# Patient Record
Sex: MALE | Race: WHITE | NOT HISPANIC OR LATINO | Employment: UNEMPLOYED | URBAN - NONMETROPOLITAN AREA
[De-identification: names, ages, dates, MRNs, and addresses within clinical notes are randomized per-mention and may not be internally consistent; named-entity substitution may affect disease eponyms.]

---

## 2022-09-30 ENCOUNTER — OFFICE VISIT (OUTPATIENT)
Dept: CARDIOLOGY | Facility: CLINIC | Age: 57
End: 2022-09-30

## 2022-09-30 ENCOUNTER — LAB (OUTPATIENT)
Dept: LAB | Facility: HOSPITAL | Age: 57
End: 2022-09-30

## 2022-09-30 VITALS
HEIGHT: 74 IN | WEIGHT: 268 LBS | SYSTOLIC BLOOD PRESSURE: 136 MMHG | OXYGEN SATURATION: 98 % | HEART RATE: 92 BPM | BODY MASS INDEX: 34.39 KG/M2 | DIASTOLIC BLOOD PRESSURE: 84 MMHG

## 2022-09-30 DIAGNOSIS — I10 HYPERTENSION, UNSPECIFIED TYPE: ICD-10-CM

## 2022-09-30 DIAGNOSIS — I25.10 CORONARY ARTERY DISEASE INVOLVING NATIVE CORONARY ARTERY OF NATIVE HEART WITHOUT ANGINA PECTORIS: ICD-10-CM

## 2022-09-30 DIAGNOSIS — E66.2 CLASS 1 OBESITY WITH ALVEOLAR HYPOVENTILATION AND BODY MASS INDEX (BMI) OF 34.0 TO 34.9 IN ADULT, UNSPECIFIED WHETHER SERIOUS COMORBIDITY PRESENT: ICD-10-CM

## 2022-09-30 DIAGNOSIS — Z76.89 ENCOUNTER TO ESTABLISH CARE: Primary | ICD-10-CM

## 2022-09-30 LAB
ANION GAP SERPL CALCULATED.3IONS-SCNC: 8.5 MMOL/L (ref 5–15)
BASOPHILS # BLD AUTO: 0.03 10*3/MM3 (ref 0–0.2)
BASOPHILS NFR BLD AUTO: 0.6 % (ref 0–1.5)
BUN SERPL-MCNC: 9 MG/DL (ref 6–20)
BUN/CREAT SERPL: 11.8 (ref 7–25)
CALCIUM SPEC-SCNC: 9.5 MG/DL (ref 8.6–10.5)
CHLORIDE SERPL-SCNC: 104 MMOL/L (ref 98–107)
CHOLEST SERPL-MCNC: 126 MG/DL (ref 0–200)
CO2 SERPL-SCNC: 27.5 MMOL/L (ref 22–29)
CREAT SERPL-MCNC: 0.76 MG/DL (ref 0.76–1.27)
DEPRECATED RDW RBC AUTO: 41.7 FL (ref 37–54)
EGFRCR SERPLBLD CKD-EPI 2021: 104.8 ML/MIN/1.73
EOSINOPHIL # BLD AUTO: 0.16 10*3/MM3 (ref 0–0.4)
EOSINOPHIL NFR BLD AUTO: 3.3 % (ref 0.3–6.2)
ERYTHROCYTE [DISTWIDTH] IN BLOOD BY AUTOMATED COUNT: 13 % (ref 12.3–15.4)
GLUCOSE SERPL-MCNC: 169 MG/DL (ref 65–99)
HCT VFR BLD AUTO: 41.5 % (ref 37.5–51)
HDLC SERPL-MCNC: 41 MG/DL (ref 40–60)
HGB BLD-MCNC: 14.4 G/DL (ref 13–17.7)
IMM GRANULOCYTES # BLD AUTO: 0.02 10*3/MM3 (ref 0–0.05)
IMM GRANULOCYTES NFR BLD AUTO: 0.4 % (ref 0–0.5)
LDLC SERPL CALC-MCNC: 59 MG/DL (ref 0–100)
LDLC/HDLC SERPL: 1.32 {RATIO}
LYMPHOCYTES # BLD AUTO: 1.94 10*3/MM3 (ref 0.7–3.1)
LYMPHOCYTES NFR BLD AUTO: 40.2 % (ref 19.6–45.3)
MCH RBC QN AUTO: 30.6 PG (ref 26.6–33)
MCHC RBC AUTO-ENTMCNC: 34.7 G/DL (ref 31.5–35.7)
MCV RBC AUTO: 88.1 FL (ref 79–97)
MONOCYTES # BLD AUTO: 0.37 10*3/MM3 (ref 0.1–0.9)
MONOCYTES NFR BLD AUTO: 7.7 % (ref 5–12)
NEUTROPHILS NFR BLD AUTO: 2.31 10*3/MM3 (ref 1.7–7)
NEUTROPHILS NFR BLD AUTO: 47.8 % (ref 42.7–76)
NRBC BLD AUTO-RTO: 0 /100 WBC (ref 0–0.2)
PLATELET # BLD AUTO: 273 10*3/MM3 (ref 140–450)
PMV BLD AUTO: 9.7 FL (ref 6–12)
POTASSIUM SERPL-SCNC: 3.9 MMOL/L (ref 3.5–5.2)
QT INTERVAL: 358 MS
QTC INTERVAL: 442 MS
RBC # BLD AUTO: 4.71 10*6/MM3 (ref 4.14–5.8)
SODIUM SERPL-SCNC: 140 MMOL/L (ref 136–145)
TRIGL SERPL-MCNC: 154 MG/DL (ref 0–150)
TSH SERPL DL<=0.05 MIU/L-ACNC: 0.7 UIU/ML (ref 0.27–4.2)
VLDLC SERPL-MCNC: 26 MG/DL (ref 5–40)
WBC NRBC COR # BLD: 4.83 10*3/MM3 (ref 3.4–10.8)

## 2022-09-30 PROCEDURE — 99204 OFFICE O/P NEW MOD 45 MIN: CPT | Performed by: INTERNAL MEDICINE

## 2022-09-30 PROCEDURE — 85025 COMPLETE CBC W/AUTO DIFF WBC: CPT | Performed by: INTERNAL MEDICINE

## 2022-09-30 PROCEDURE — 36415 COLL VENOUS BLD VENIPUNCTURE: CPT | Performed by: INTERNAL MEDICINE

## 2022-09-30 PROCEDURE — 80061 LIPID PANEL: CPT

## 2022-09-30 PROCEDURE — 84443 ASSAY THYROID STIM HORMONE: CPT | Performed by: INTERNAL MEDICINE

## 2022-09-30 PROCEDURE — 80048 BASIC METABOLIC PNL TOTAL CA: CPT | Performed by: INTERNAL MEDICINE

## 2022-09-30 PROCEDURE — 93000 ELECTROCARDIOGRAM COMPLETE: CPT | Performed by: INTERNAL MEDICINE

## 2022-09-30 RX ORDER — ASPIRIN 81 MG/1
81 TABLET, CHEWABLE ORAL DAILY
COMMUNITY

## 2022-09-30 RX ORDER — DIPHENHYDRAMINE HCL 25 MG
25 CAPSULE ORAL EVERY 6 HOURS PRN
COMMUNITY

## 2022-09-30 RX ORDER — DOCUSATE SODIUM 100 MG/1
100 CAPSULE, LIQUID FILLED ORAL 2 TIMES DAILY
COMMUNITY

## 2022-09-30 RX ORDER — MELATONIN
1000 DAILY
COMMUNITY

## 2022-09-30 RX ORDER — CHLORAL HYDRATE 500 MG
CAPSULE ORAL
COMMUNITY

## 2022-09-30 RX ORDER — PREGABALIN 100 MG/1
100 CAPSULE ORAL 2 TIMES DAILY
COMMUNITY

## 2022-09-30 RX ORDER — CYCLOBENZAPRINE HCL 10 MG
10 TABLET ORAL 3 TIMES DAILY PRN
COMMUNITY

## 2022-09-30 RX ORDER — MECLIZINE HCL 25MG 25 MG/1
25 TABLET, CHEWABLE ORAL 3 TIMES DAILY PRN
COMMUNITY

## 2022-09-30 RX ORDER — CHOLECALCIFEROL (VITAMIN D3) 125 MCG
5 CAPSULE ORAL
COMMUNITY

## 2022-09-30 RX ORDER — LISINOPRIL 20 MG/1
20 TABLET ORAL DAILY
COMMUNITY

## 2022-09-30 RX ORDER — DILTIAZEM HYDROCHLORIDE 240 MG/1
240 CAPSULE, COATED, EXTENDED RELEASE ORAL DAILY
COMMUNITY

## 2022-09-30 RX ORDER — HYDROCODONE BITARTRATE AND ACETAMINOPHEN 10; 325 MG/1; MG/1
1 TABLET ORAL EVERY 6 HOURS PRN
COMMUNITY

## 2022-09-30 RX ORDER — CLOPIDOGREL BISULFATE 75 MG/1
75 TABLET ORAL DAILY
COMMUNITY

## 2022-09-30 NOTE — PROGRESS NOTES
[facility] Cardiology  OFFICE NOTE    Cardiovascular Medicine  Alverto Flanagan M.D., [facility]         Keaton Lamar MD  [address] [place] [address]    Thank you for asking me to see Preston Villalta for coronary artery disease.    History of Present Illness    Preston Villalta is a 57 y.o. male who presents for consultation today.   Available prior records from [facility] were reviewed.  According to last cardiology clinic note (September 2016, Dr. Kenrick Abraham); he has a known history of coronary artery disease, s/p MI with stenting to the LAD in 2011.  Last coronary angiography was in July 2013; this showed nonobstructive CAD; stent in the LAD was widely patent; he had an ectatic left main and RCA without stenosis.  Last transthoracic echocardiogram in June 2013 showed LVEF of 55 to 60%, normal biatrial sizes, normal RV cavity size/systolic function, trace MR, trace TR and no evidence of pericardial effusion (by report).  He denies any prior history of open heart surgeries, pacemaker placement or defibrillator placement.  He has not had any cardiac catheterizations after the last cardiac cath in 2013 (according to him).  During his routine day-to-day activities, he denies having any chest discomfort or dyspnea.  Does report feeling mildly short of breath when he overexerts himself.  He has not had any PND, orthopnea or leg swelling.  He denies having any syncope or presyncope.    Review of Systems - ROS  Constitution: Negative for weight gain and weight loss.   HENT: Negative for congestion.    Eyes: Negative for blurred vision.   Cardiovascular: As mentioned above  Respiratory: Negative for cough and hemoptysis.    Endocrine: Negative for polydipsia and polyuria.   Hematologic/Lymphatic: Negative for bleeding problem.   Skin: Negative for flushing.   Musculoskeletal: Negative for neck pain and stiffness.   Gastrointestinal: Negative for abdominal pain,  nausea and vomiting.   Genitourinary: Negative for dysuria and hematuria.   Neurological: Negative for dizziness, focal weakness and numbness.   Psychiatric/Behavioral: Negative for altered mental status and depression.      All other systems were reviewed and were negative.    Past Medical History:   Diagnosis Date   • Cancer (HCC)    • Coronary artery disease    • Hypertension    • Myocardial infarction (HCC)    • Sleep apnea        Family History:  family history includes Diabetes in his mother; Heart attack in his father; Stroke in his father and mother.    Social History: Denies current alcohol or illicit drug abuse.   reports that he quit smoking about 12 years ago. He smoked 1.00 pack per day. He has never used smokeless tobacco. He reports previous alcohol use. He reports that he does not use drugs.    Allergies:  Allergies   Allergen Reactions   • Penicillins Itching and Rash     Itch  Itch           Current Outpatient Medications:   •  aspirin 81 MG chewable tablet, Chew 81 mg Daily., Disp: , Rfl:   •  cholecalciferol (VITAMIN D3) 25 MCG (1000 UT) tablet, Take 1,000 Units by mouth Daily., Disp: , Rfl:   •  clopidogrel (PLAVIX) 75 MG tablet, Take 75 mg by mouth Daily., Disp: , Rfl:   •  cyclobenzaprine (FLEXERIL) 10 MG tablet, Take 10 mg by mouth 3 (Three) Times a Day As Needed for Muscle Spasms., Disp: , Rfl:   •  dilTIAZem CD (CARDIZEM CD) 240 MG 24 hr capsule, Take 240 mg by mouth Daily., Disp: , Rfl:   •  diphenhydrAMINE (BENADRYL) 25 mg capsule, Take 25 mg by mouth Every 6 (Six) Hours As Needed for Itching., Disp: , Rfl:   •  docusate sodium (COLACE) 100 MG capsule, Take 100 mg by mouth 2 (Two) Times a Day., Disp: , Rfl:   •  HYDROcodone-acetaminophen (NORCO)  MG per tablet, Take 1 tablet by mouth Every 6 (Six) Hours As Needed for Moderate Pain., Disp: , Rfl:   •  lisinopril (PRINIVIL,ZESTRIL) 20 MG tablet, Take 20 mg by mouth Daily., Disp: , Rfl:   •  meclizine 25 MG chewable tablet chewable  "tablet, Chew 25 mg 3 (Three) Times a Day As Needed., Disp: , Rfl:   •  melatonin 5 MG tablet tablet, Take 5 mg by mouth., Disp: , Rfl:   •  milnacipran (SAVELLA) 50 MG tablet tablet, Take  by mouth 2 (Two) Times a Day., Disp: , Rfl:   •  Omega-3 Fatty Acids (fish oil) 1000 MG capsule capsule, Take  by mouth Daily With Breakfast., Disp: , Rfl:   •  pregabalin (LYRICA) 100 MG capsule, Take 100 mg by mouth 2 (Two) Times a Day., Disp: , Rfl:   •  Rosuvastatin Calcium 40 MG capsule sprinkle, Take  by mouth., Disp: , Rfl:   •  vitamin E 100 UNIT capsule, Take 100 Units by mouth Daily., Disp: , Rfl:     Physical Exam:  Vitals:    09/30/22 0858   BP: 136/84   BP Location: Left arm   Patient Position: Sitting   Cuff Size: Adult   Pulse: 92   SpO2: 98%   Weight: 122 kg (268 lb)   Height: 188 cm (74\")   PainSc: 0-No pain     Current Pain Level: none  Pulse Ox: Normal  on room air  General: alert, appears stated age and cooperative     Body Habitus: Obese  HEENT: Head: Normocephalic, no lesions, without obvious abnormality.     Neuro: alert, oriented x3  JVP: Volume/Pulsation: Normal.  Normal waveforms.   Appropriate inspiratory decrease.     Carotid Exam: no bruit normal pulsation bilaterally   Carotid Volume: normal.     Respirations: no increased work of breathing   Chest:  Normal    Pulmonary:Normal   Heart rate: normal    Heart Rhythm: regular     Heart Sounds: S1: normal  S2: normal  S3: absent     Faint systolic murmur heard at right upper sternal border  Abdomen:   Appearance: normal .  Palpation: Soft, non-tender to palpation, bowel sounds positive in all four quadrants  Extremity: no edema.     DATA REVIEWED:     EKG. I personally reviewed and interpreted the EKG.  normal sinus rhythm, 1st degree AV block, poor R wave progression, nonspecific ST and T wave abnormality on 9/30/2022    ECG/EMG Results (all)     Procedure Component Value Units Date/Time    ECG 12 Lead [[id number]] Collected: 09/30/22 0851     Updated: " 09/30/22 0902     QT Interval 358 ms      QTC Interval 442 ms     Narrative:      Test Reason : est care  Blood Pressure :   */*   mmHG  Vent. Rate :  92 BPM     Atrial Rate :  92 BPM     P-R Int : 244 ms          QRS Dur :  94 ms      QT Int : 358 ms       P-R-T Axes :  57  45  68 degrees     QTc Int : 442 ms    Sinus rhythm with 1st degree AV block  Possible Anterior infarct , age undetermined  Abnormal ECG  No previous ECGs available    Referred By: TERRANCE           Confirmed By:         ---------------------------------------------------  TTE/EDWARD:      -----------------------------------------------------  CXR/Imaging:   Imaging Results (Most Recent)     None          -----------------------------------------------------  CT:   No radiology results for the last 30 days.    ----------------------------------------------------      --------------------------------------------------------------------------------------------------  LABS:     The CVD Risk score (Jose et al., 2008) failed to calculate for the following reasons:    Cannot find a previous HDL lab    Cannot find a previous total cholesterol lab         No results found for: GLUCOSE, BUN, CREATININE, EGFRIFNONA, EGFRIFAFRI, BCR, K, CO2, CALCIUM, PROTENTOTREF, ALBUMIN, LABIL2, BILIRUBIN, AST, ALT  No results found for: WBC, HGB, HCT, MCV, PLT  No results found for: CHOL, CHLPL, TRIG, HDL, LDL, LDLDIRECT  No results found for: TSH, L2EMJRK, F7ZXYPM, THYROIDAB  No results found for: CKTOTAL, CKMB, CKMBINDEX, TROPONINI, TROPONINT  No results found for: HGBA1C  No results found for: DDIMER  No results found for: ALT  No results found for: HGBA1C  No results found for: GLUF, MICROALBUR, CREATININE  No results found for: IRON, TIBC, FERRITIN  No results found for: INR, PROTIME    [unfilled]    1. Encounter to establish care  - ECG 12 Lead    2. Coronary artery disease involving native coronary artery of native heart without angina pectoris  - Adult  Transthoracic Echo Complete W/ Cont if Necessary Per Protocol; Future  - Basic Metabolic Panel  - CBC & Differential  - Lipid Panel; Future  - TSH    Available prior records from [facility] were reviewed.  According to last cardiology clinic note (September 2016, Dr. Kenrick Abraham); he has a known history of coronary artery disease, s/p MI with stenting to the LAD in 2011.  Last coronary angiography was in July 2013; this showed nonobstructive CAD; stent in the LAD was widely patent; he had an ectatic left main and RCA without stenosis.  Last transthoracic echocardiogram in June 2013 showed LVEF of 55 to 60%, normal biatrial sizes, normal RV cavity size/systolic function, trace MR, trace TR and no evidence of pericardial effusion (by report).  He denies any prior history of open heart surgeries, pacemaker placement or defibrillator placement.  He has not had any cardiac catheterizations after the last cardiac cath in 2013 (according to him).  At present, he denies having any chest discomfort or dyspnea during his routine day-to-day activities.  Continue baby aspirin, Plavix, Crestor and fish oil therapy.  Obtain baseline labs.  Obtain a transthoracic echocardiogram to reevaluate his LV function and look for any new underlying structural heart disease.    3. Hypertension, unspecified type  Clinic BP was 136/84 mm Hg today.  No changes were made to his antihypertensive medication regimen of diltiazem and lisinopril.  Dietary sodium restriction was discussed.  - Basic Metabolic Panel  - CBC & Differential  - TSH    4. Class 1 obesity with alveolar hypoventilation and body mass index (BMI) of 34.0 to 34.9 in adult, unspecified whether serious comorbidity present (HCC)  Dietary modification and regular physical activity were discussed.      Prevention:  BMI is >= 30 and <35. (Class 1 Obesity). The following options were offered after discussion;: referral to primary care      Preston Villalta  reports that he quit  smoking about 12 years ago. He smoked 1.00 pack per day. He has never used smokeless tobacco.. I have educated him on continued tobacco cessation.      Return in about 3 months (around 12/30/2022).            Electronically signed by Alverto Flanagan MD on 09/30/22 at 09:12 CDT

## 2022-10-01 NOTE — PROGRESS NOTES
LDL cholesterol is at goal.  Triglyceride levels are very close to goal.  Continue same medications.

## 2022-10-06 ENCOUNTER — TELEPHONE (OUTPATIENT)
Dept: CARDIOLOGY | Facility: CLINIC | Age: 57
End: 2022-10-06

## 2022-10-06 NOTE — TELEPHONE ENCOUNTER
[facility]----- Message from Alverto Flanagan MD sent at 10/1/2022 11:26 AM CDT -----  LDL cholesterol is at goal.  Triglyceride levels are very close to goal.  Continue same medications.

## 2022-10-06 NOTE — TELEPHONE ENCOUNTER
[facility] ----- Message from Alverto Flanagan MD sent at 10/1/2022 11:25 AM CDT -----  Serum TSH and CBC are unremarkable.  Kidney function and electrolytes are unremarkable as well.

## 2022-12-12 ENCOUNTER — TELEPHONE (OUTPATIENT)
Dept: CARDIOLOGY | Facility: CLINIC | Age: 57
End: 2022-12-12

## 2022-12-12 NOTE — TELEPHONE ENCOUNTER
Tried to call pt  No answer vml   ----- Message from Alverto Flanagan MD sent at 12/10/2022 11:54 AM CST -----  Transthoracic echocardiogram showed normal LV systolic function, borderline dilatation of the right ventricle.  Dilatation of the aortic root and proximal ascending aorta was also noted.  Will discuss further at next appointment.

## 2022-12-14 ENCOUNTER — OFFICE VISIT (OUTPATIENT)
Dept: CARDIOLOGY | Facility: CLINIC | Age: 57
End: 2022-12-14

## 2022-12-14 VITALS
DIASTOLIC BLOOD PRESSURE: 84 MMHG | OXYGEN SATURATION: 99 % | HEART RATE: 94 BPM | HEIGHT: 74 IN | BODY MASS INDEX: 33.75 KG/M2 | WEIGHT: 263 LBS | SYSTOLIC BLOOD PRESSURE: 142 MMHG

## 2022-12-14 DIAGNOSIS — E66.2 CLASS 1 OBESITY WITH ALVEOLAR HYPOVENTILATION AND BODY MASS INDEX (BMI) OF 34.0 TO 34.9 IN ADULT, UNSPECIFIED WHETHER SERIOUS COMORBIDITY PRESENT: ICD-10-CM

## 2022-12-14 DIAGNOSIS — I77.819 AORTIC DILATATION: ICD-10-CM

## 2022-12-14 DIAGNOSIS — I10 HYPERTENSION, UNSPECIFIED TYPE: ICD-10-CM

## 2022-12-14 DIAGNOSIS — I25.10 CORONARY ARTERY DISEASE INVOLVING NATIVE CORONARY ARTERY OF NATIVE HEART WITHOUT ANGINA PECTORIS: Primary | ICD-10-CM

## 2022-12-14 DIAGNOSIS — I51.7 RIGHT VENTRICULAR DILATION: ICD-10-CM

## 2022-12-14 PROCEDURE — 99214 OFFICE O/P EST MOD 30 MIN: CPT | Performed by: INTERNAL MEDICINE

## 2022-12-14 NOTE — PROGRESS NOTES
[facility] Cardiology  OFFICE NOTE    Cardiovascular Medicine  Alverto Flanagan M.D., [facility]         No referring provider defined for this encounter.    History of Present Illness    Preston Villalta is a 57 y.o. male who presents for consultation today.   Available prior records from [facility] were reviewed.  According to last cardiology clinic note (September 2016, Dr. Kenrick Abraham); he has a known history of coronary artery disease, s/p MI with stenting to the LAD in 2011.  Last coronary angiography was in July 2013; this showed nonobstructive CAD; stent in the LAD was widely patent; he had an ectatic left main and RCA without stenosis.  Last transthoracic echocardiogram in June 2013 showed LVEF of 55 to 60%, normal biatrial sizes, normal RV cavity size/systolic function, trace MR, trace TR and no evidence of pericardial effusion (by report).  He denies any prior history of open heart surgeries, pacemaker placement or defibrillator placement.  He has not had any cardiac catheterizations after the last cardiac cath in 2013 (according to him).  During his routine day-to-day activities, he denies having any chest discomfort or dyspnea.  Does report feeling mildly short of breath when he overexerts himself.  He has not had any PND, orthopnea or leg swelling.  He denies having any syncope or presyncope.    12/14/2022:  He presented today for a follow-up visit.  He continues to do well from cardiovascular standpoint.  Denied any concerning cardiovascular symptoms today.  Results of recent transthoracic echocardiogram were reviewed with him in detail.  He admits to nighttime snoring, daytime fatigue and daytime sleepiness.  He reports that he was diagnosed with sleep apnea a long time ago; he tried wearing the mask and it did not work out at that time.    Review of Systems - ROS  Constitution: Negative for weight gain and weight loss.   HENT: Negative for congestion.    Eyes: Negative for  blurred vision.   Cardiovascular: As mentioned above  Respiratory: Negative for cough and hemoptysis.    Endocrine: Negative for polydipsia and polyuria.   Hematologic/Lymphatic: Negative for bleeding problem.   Skin: Negative for flushing.   Musculoskeletal: Negative for neck pain and stiffness.   Gastrointestinal: Negative for abdominal pain, nausea and vomiting.   Genitourinary: Negative for dysuria and hematuria.   Neurological: Negative for dizziness, focal weakness and numbness.   Psychiatric/Behavioral: Negative for altered mental status and depression.      All other systems were reviewed and were negative.    Past Medical History:   Diagnosis Date   • Cancer (HCC)    • Coronary artery disease    • Hypertension    • Myocardial infarction (HCC)    • Sleep apnea        Family History:  family history includes Diabetes in his mother; Heart attack in his father; Stroke in his father and mother.    Social History: Denies current alcohol or illicit drug abuse.   reports that he quit smoking about 12 years ago. His smoking use included cigarettes. He smoked an average of 1 pack per day. He has never used smokeless tobacco. He reports that he does not currently use alcohol. He reports that he does not use drugs.    Allergies:  Allergies   Allergen Reactions   • Penicillins Itching and Rash     Itch  Itch           Current Outpatient Medications:   •  aspirin 81 MG chewable tablet, Chew 81 mg Daily., Disp: , Rfl:   •  cholecalciferol (VITAMIN D3) 25 MCG (1000 UT) tablet, Take 1,000 Units by mouth Daily., Disp: , Rfl:   •  clopidogrel (PLAVIX) 75 MG tablet, Take 75 mg by mouth Daily., Disp: , Rfl:   •  cyclobenzaprine (FLEXERIL) 10 MG tablet, Take 10 mg by mouth 3 (Three) Times a Day As Needed for Muscle Spasms., Disp: , Rfl:   •  dilTIAZem CD (CARDIZEM CD) 240 MG 24 hr capsule, Take 240 mg by mouth Daily., Disp: , Rfl:   •  diphenhydrAMINE (BENADRYL) 25 mg capsule, Take 25 mg by mouth Every 6 (Six) Hours As Needed  "for Itching., Disp: , Rfl:   •  docusate sodium (COLACE) 100 MG capsule, Take 100 mg by mouth 2 (Two) Times a Day., Disp: , Rfl:   •  HYDROcodone-acetaminophen (NORCO)  MG per tablet, Take 1 tablet by mouth Every 6 (Six) Hours As Needed for Moderate Pain., Disp: , Rfl:   •  lisinopril (PRINIVIL,ZESTRIL) 20 MG tablet, Take 20 mg by mouth Daily., Disp: , Rfl:   •  meclizine 25 MG chewable tablet chewable tablet, Chew 25 mg 3 (Three) Times a Day As Needed., Disp: , Rfl:   •  melatonin 5 MG tablet tablet, Take 5 mg by mouth., Disp: , Rfl:   •  milnacipran (SAVELLA) 50 MG tablet tablet, Take  by mouth 2 (Two) Times a Day., Disp: , Rfl:   •  Omega-3 Fatty Acids (fish oil) 1000 MG capsule capsule, Take  by mouth Daily With Breakfast., Disp: , Rfl:   •  pregabalin (LYRICA) 100 MG capsule, Take 100 mg by mouth 2 (Two) Times a Day., Disp: , Rfl:   •  Rosuvastatin Calcium 40 MG capsule sprinkle, Take  by mouth., Disp: , Rfl:   •  vitamin E 100 UNIT capsule, Take 100 Units by mouth Daily., Disp: , Rfl:     Physical Exam:  Vitals:    12/14/22 1349   BP: 142/84   Pulse: 94   SpO2: 99%   Weight: 119 kg (263 lb)   Height: 188 cm (74\")   PainSc: 0-No pain     Current Pain Level: none  Pulse Ox: Normal  on room air  General: alert, appears stated age and cooperative     Body Habitus: Obese  HEENT: Head: Normocephalic, no lesions, without obvious abnormality.     Neuro: alert, oriented x3  JVP: Volume/Pulsation: Normal.  Normal waveforms.   Appropriate inspiratory decrease.     Carotid Exam: no bruit normal pulsation bilaterally   Carotid Volume: normal.     Respirations: no increased work of breathing   Chest:  Normal    Pulmonary:Normal   Heart rate: normal    Heart Rhythm: regular     Heart Sounds: S1: normal  S2: normal  S3: absent     Faint systolic murmur heard at right upper sternal border  Abdomen:   Appearance: normal .  Palpation: Soft, non-tender to palpation, bowel sounds positive in all four quadrants  Extremity: no " edema.     DATA REVIEWED:     EKG. I personally reviewed and interpreted the EKG.  normal sinus rhythm, 1st degree AV block, poor R wave progression, nonspecific ST and T wave abnormality on 9/30/2022    ECG/EMG Results (all)     Procedure Component Value Units Date/Time    ECG 12 Lead [[id number]] Collected: 09/30/22 0851     Updated: 09/30/22 0902     QT Interval 358 ms      QTC Interval 442 ms     Narrative:      Test Reason : est care  Blood Pressure :   */*   mmHG  Vent. Rate :  92 BPM     Atrial Rate :  92 BPM     P-R Int : 244 ms          QRS Dur :  94 ms      QT Int : 358 ms       P-R-T Axes :  57  45  68 degrees     QTc Int : 442 ms    Sinus rhythm with 1st degree AV block  Possible Anterior infarct , age undetermined  Abnormal ECG  No previous ECGs available    Referred By: TERRANCE           Confirmed By:         ---------------------------------------------------  TTE/EDWARD:  Results for orders placed in visit on 12/08/22    Adult Transthoracic Echo Complete W/ Cont if Necessary Per Protocol    Interpretation Summary  •  Left ventricular systolic function is normal. Left ventricular ejection fraction appears to be 56 - 60%.  •  Left ventricular diastolic function was normal.  •  The right ventricular cavity is borderline dilated.  •  Saline test results did not show any definite evidence of right to left atrial level shunt at baseline state. Valsalva maneuver was not performed.  •  Aortic root appears dilated and measured at 4.4 cm. Proximal ascending aorta measured at 3.9 cm.      -----------------------------------------------------  CXR/Imaging:   Imaging Results (Most Recent)     None          -----------------------------------------------------  CT:   No radiology results for the last 30 days.    ----------------------------------------------------      --------------------------------------------------------------------------------------------------  LABS:     The CVD Risk score (Jose et al.,  2008) failed to calculate for the following reasons:    Cannot find a previous HDL lab    Cannot find a previous total cholesterol lab         Lab Results   Component Value Date    GLUCOSE 169 (H) 09/30/2022    BUN 9 09/30/2022    CREATININE 0.76 09/30/2022    BCR 11.8 09/30/2022    K 3.9 09/30/2022    CO2 27.5 09/30/2022    CALCIUM 9.5 09/30/2022     Lab Results   Component Value Date    WBC 4.83 09/30/2022    HGB 14.4 09/30/2022    HCT 41.5 09/30/2022    MCV 88.1 09/30/2022     09/30/2022     Lab Results   Component Value Date    CHOL 126 09/30/2022    TRIG 154 (H) 09/30/2022    HDL 41 09/30/2022    LDL 59 09/30/2022     Lab Results   Component Value Date    TSH 0.704 09/30/2022     No results found for: CKTOTAL, CKMB, CKMBINDEX, TROPONINI, TROPONINT  No results found for: HGBA1C  No results found for: DDIMER  No results found for: ALT  No results found for: HGBA1C  Lab Results   Component Value Date    CREATININE 0.76 09/30/2022     No results found for: IRON, TIBC, FERRITIN  No results found for: INR, PROTIME    [unfilled]    1. Coronary artery disease involving native coronary artery of native heart without angina pectoris  According to last cardiology clinic note from [facility] (September 2016, Dr. Kenrick Abraham); he has a known history of coronary artery disease, s/p MI with stenting to the LAD in 2011.  Last coronary angiography was in July 2013; this showed nonobstructive CAD; stent in the LAD was widely patent; he had an ectatic left main and RCA without stenosis.  Transthoracic echocardiogram in June 2013 showed LVEF of 55 to 60%, normal biatrial sizes, normal RV cavity size/systolic function, trace MR, trace TR and no evidence of pericardial effusion (by report).  Transthoracic echocardiogram in December 2022 showed LVEF of 56 to 60%, mild concentric LVH, normal biatrial sizes, trace MR, trace TR, mild NJ and a trivial pericardial effusion.  Right ventricular cavity was borderline  dilated.  Proximal ascending aorta measured at 3.9 cm.  At present, he denies having any chest discomfort or dyspnea during his routine day-to-day activities.  Continue baby aspirin, Plavix, Crestor and fish oil therapy.  Serum LDL cholesterol was at goal in September 2022.  Serum triglyceride level was borderline elevated at 154 mg/dL.  Conservative measures are recommended.    2. Right ventricular dilation  He admits to nighttime snoring, daytime fatigue and daytime sleepiness.  He reports that he was diagnosed with sleep apnea a long time ago; he tried wearing the mask and it did not work out at that time.  Given the finding of RV dilation on recent transthoracic echocardiogram, we placed a referral to sleep medicine for further evaluation.    3. Hypertension, unspecified type  4. Aortic dilation  Clinic BP was 142/84 mm Hg today.  Home BP readings have been similar.  Continue diltiazem at current doses.  Increase the dose of lisinopril from 20 mg oral daily to 40 mg orally daily.  Dietary sodium restriction was discussed.  On his most recent TTE, proximal ascending aorta appeared dilated and measured at 3.9 cm.  We will continue to monitor for now.    5. Class 1 obesity with alveolar hypoventilation and body mass index (BMI) of 33.0 to 33.9 in adult, unspecified whether serious comorbidity present (HCC)  Dietary modification and regular physical activity were discussed.      Prevention:  BMI is >= 30 and <35. (Class 1 Obesity). The following options were offered after discussion;: referral to primary care      Preston Villalta  reports that he quit smoking about 12 years ago. His smoking use included cigarettes. He smoked an average of 1 pack per day. He has never used smokeless tobacco.. I have educated him on continued tobacco cessation.      Return in about 6 months (around 6/14/2023).            Electronically signed by Alverto Flanagan MD on 12/14/22 at 09:12 CDT

## 2023-04-14 ENCOUNTER — OFFICE VISIT (OUTPATIENT)
Dept: SLEEP MEDICINE | Facility: HOSPITAL | Age: 58
End: 2023-04-14
Payer: OTHER GOVERNMENT

## 2023-04-14 VITALS
HEART RATE: 90 BPM | WEIGHT: 274.2 LBS | OXYGEN SATURATION: 95 % | DIASTOLIC BLOOD PRESSURE: 100 MMHG | SYSTOLIC BLOOD PRESSURE: 138 MMHG | HEIGHT: 74 IN | BODY MASS INDEX: 35.19 KG/M2

## 2023-04-14 DIAGNOSIS — R06.81 WITNESSED EPISODE OF APNEA: ICD-10-CM

## 2023-04-14 DIAGNOSIS — R06.83 SNORING: Primary | ICD-10-CM

## 2023-04-14 DIAGNOSIS — G47.19 EXCESSIVE DAYTIME SLEEPINESS: ICD-10-CM

## 2023-04-14 DIAGNOSIS — G47.33 OSA (OBSTRUCTIVE SLEEP APNEA): ICD-10-CM

## 2023-04-14 PROCEDURE — 99203 OFFICE O/P NEW LOW 30 MIN: CPT | Performed by: NURSE PRACTITIONER

## 2023-04-14 NOTE — PROGRESS NOTES
New Patient Sleep Medicine Consultation    Encounter Date: 4/14/2023         Patient's PCP: Harry Melo MD  Referring provider: Alverto Flanagan MD  Reason for consultation chief complaint: snoring, awakening gasping for breath, witnessed apneas, excessive daytime sleepiness, unrefreshing sleep and insomnia    Preston Villalta is a 57 y.o. male whose bedtime is ~ 7327-1846. He  falls asleep after 0-60 minutes, and is up 3-5 times per night. Rolls over, may be up to the bathroom once. Able to fall back asleep quickly.  He wakes up ~ 0430, later on days off. Every day of the week.  He endorses 6-8 hours of sleep. He drinks 0 cups of coffee, 0 teas, and 3-4 sodas per day. He drinks 0 alcoholic beverages per week.      Preston Villalta admits to snoring, unrestful sleep, High blod pressure, excessive daytime sleepiness, morning headaches, irritability, Disturbed or restless sleep, memory loss, sleepy driving, night sweats, difficulty falling asleep, difficulty staying asleep and takes medicine to help go to sleep for >5 years. He denies cataplexy, sleep paralysis, or hypnagogic hallucinations. He takes melatonin for sleep.has taken for years.  He has  sleepiness with driving. Denies close calls or accidents related to falling asleep at the wheel.  He naps most days. He has had a sleep study. States a very long time ago. Has not used CPAP in years.  He sleeps in a bed alone or with wife.     Had MI with stent placement in 2010. Following cardiology.     He used to smoke, but has not smoked for years. Smoking history: smoked 1 ppds from age 21 until 45      Marital status:    Occupation: [employer]  FH of sleep disorders: father had sleep apnea       Past Medical History:   Diagnosis Date   • Cancer    • Coronary artery disease    • Hypertension    • Myocardial infarction    • Sleep apnea      Social History     Socioeconomic History   • Marital status:    Tobacco Use   • Smoking status: Former     Packs/day:  1.00     Types: Cigarettes     Quit date:      Years since quittin.2   • Smokeless tobacco: Never   Substance and Sexual Activity   • Alcohol use: Not Currently   • Drug use: Never   • Sexual activity: Defer     Family History   Problem Relation Age of Onset   • Stroke Mother    • Diabetes Mother    • Stroke Father    • Heart attack Father          Review of Systems:  Constitutional: positive for fatigue  Eyes: negative  Ears, nose, mouth, throat, and face: positive for snoring  Respiratory: negative  Cardiovascular: negative  Gastrointestinal: negative  Genitourinary:negative  Integument/breast: negative  Hematologic/lymphatic: negative  Musculoskeletal:positive for arthralgias  Neurological: negative  Behavioral/Psych: positive for sleep disturbance  Endocrine: negative  Allergic/Immunologic: negative Patient advised to discuss any positive ROS with PCP.        Dumas Sleepiness Scale Score: 14    How likely are you to doze off or fall asleep in the following situation, in contrast to feeling just tired?     Use the following scale to choose the most appropriate number for each situation:    0 = would never doze  1 = slight chance of dozing   2 = moderate chance of dozing   3 = high chance of dozing    It is important that you answer each question as best you can.      Situation       Chance of Dozing (0-3)    Sitting and reading            ___2____    Watching TV          ___2____    Sitting, inactive in a public place (e.g. a theatre or meeting)   ___2____    As a passenger in a car for an hour without break    ___2____    Lying down to rest in the afternoon, when circumstances permit  ___2____    Sitting and talking to someone      ___1____    Sitting quietly after a lunch without alcohol     ___2____    In a car, while stopped for a few minutes in traffic    ___1____         Vitals:    23 0851   BP: 138/100   Pulse: 90   SpO2: 95%           23  0851   Weight: 124 kg (274 lb 3.2 oz)  "      Body mass index is 35.21 kg/m². Class 2 Severe Obesity (BMI >=35 and <=39.9). Obesity-related health conditions include the following: obstructive sleep apnea and hypertension. Obesity is unchanged. BMI is is above average; BMI management plan is completed. We discussed portion control and increasing exercise.      Neck circumference: 20\"          General: Alert. Cooperative. Well developed. No acute distress.             Head:  Normocephalic. Symmetrical. Atraumatic.              Eyes: Sclera clear. No icterus. Normal EOM.             Ears: No deformities. Normal hearing.             Nose: No septal deviation. No drainage.          Throat: No oral lesions. No thrush. Moist mucous membranes. Trachea midline    Tongue is normal    Dentition is edentulous        Pharynx: Posterior pharyngeal pillars are narrow    Mallampati score of IV (only hard palate visible)    Pharynx is nonerythematous   Chest Wall:  Normal shape. Symmetric expansion with respiration. No tenderness.          Lungs:  Clear to auscultation bilaterally. No wheezes. No rhonchi. No rales. Respirations regular, even and unlabored.            Heart:  Regular rhythm and normal rate. Normal S1 and S2. No murmur.  Extremities:  Moves all extremities well. No edema.               Skin: Dry. Intact. No bleeding. No rash.           Neuro: Moves all 4 extremities and cranial nerves grossly intact.  Psychiatric: Normal mood and affect.      Current Outpatient Medications:   •  aspirin 81 MG chewable tablet, Chew 1 tablet Daily., Disp: , Rfl:   •  cholecalciferol (VITAMIN D3) 25 MCG (1000 UT) tablet, Take 1 tablet by mouth Daily., Disp: , Rfl:   •  clopidogrel (PLAVIX) 75 MG tablet, Take 1 tablet by mouth Daily., Disp: , Rfl:   •  cyclobenzaprine (FLEXERIL) 10 MG tablet, Take 1 tablet by mouth 3 (Three) Times a Day As Needed for Muscle Spasms., Disp: , Rfl:   •  dilTIAZem CD (CARDIZEM CD) 240 MG 24 hr capsule, Take 1 capsule by mouth Daily., Disp: , Rfl: "   •  diphenhydrAMINE (BENADRYL) 25 mg capsule, Take 1 capsule by mouth Every 6 (Six) Hours As Needed for Itching., Disp: , Rfl:   •  docusate sodium (COLACE) 100 MG capsule, Take 1 capsule by mouth 2 (Two) Times a Day., Disp: , Rfl:   •  HYDROcodone-acetaminophen (NORCO)  MG per tablet, Take 1 tablet by mouth Every 6 (Six) Hours As Needed for Moderate Pain., Disp: , Rfl:   •  lisinopril (PRINIVIL,ZESTRIL) 20 MG tablet, Take 1 tablet by mouth Daily., Disp: , Rfl:   •  meclizine 25 MG chewable tablet chewable tablet, Chew 1 tablet 3 (Three) Times a Day As Needed., Disp: , Rfl:   •  melatonin 5 MG tablet tablet, Take 1 tablet by mouth., Disp: , Rfl:   •  milnacipran (SAVELLA) 50 MG tablet tablet, Take  by mouth 2 (Two) Times a Day., Disp: , Rfl:   •  Omega-3 Fatty Acids (fish oil) 1000 MG capsule capsule, Take  by mouth Daily With Breakfast., Disp: , Rfl:   •  pregabalin (LYRICA) 100 MG capsule, Take 1 capsule by mouth 2 (Two) Times a Day., Disp: , Rfl:   •  Rosuvastatin Calcium 40 MG capsule sprinkle, Take  by mouth., Disp: , Rfl:   •  vitamin E 100 UNIT capsule, Take 1 capsule by mouth Daily., Disp: , Rfl:     Lab Results   Component Value Date    WBC 4.83 09/30/2022    HGB 14.4 09/30/2022    HCT 41.5 09/30/2022    MCV 88.1 09/30/2022     09/30/2022     Lab Results   Component Value Date    GLUCOSE 169 (H) 09/30/2022    CALCIUM 9.5 09/30/2022     09/30/2022    K 3.9 09/30/2022    CO2 27.5 09/30/2022     09/30/2022    BUN 9 09/30/2022    CREATININE 0.76 09/30/2022    BCR 11.8 09/30/2022    ANIONGAP 8.5 09/30/2022     No results found for: INR, PROTIME  No results found for: CKTOTAL, CKMB, CKMBINDEX, TROPONINI, TROPONINT    No results found for: PHART, XUD8WUT, PO2ART]    Contraindications to home sleep test: Ongoing narcotic therapy especially with infusion pumps and coronary artery disease    Assessment and Plan:    1. Excessive daytime sleepiness, known LINCOLN- New to me, additional work-up  planned   1. Check split night PSG- do not nap on day of sleep study. Discussed possibility of having to come back in for PAP titration if not able to successfully titrate on first night. He is agreeable   2. Good sleep hygiene   3. Pertinent labs reviewed   4. Drowsy driving tips- do not drive if feeling sleepy   5. RTC in 2 weeks with study results   2.   Snoring- New to me, additional work-up planned    1.   As above   3.   Frequent nocturnal awakening   4.  Ongoing opioid therapy   5.   Witnessed episode of apnea   6. CAD      An in lab PAP titration study has been ordered. The patient was educated regarding the recent safety recall of [employer] CPAP/BiPAP machines, and the continued usage of these machines at [facility]. The patient was informed of the safety precautions being used to decrease potential risks of machine usage during the titration study, including: in-line filter usage, and the implementation of single-use, disposable masks and tubing. While these steps do not completely eliminate the risk of potential exposure to particulates and emissions from the machine's sound absorbing foam, the health risks of untreated or poorly controlled sleep apnea outweigh the potential risk of particulate or emission exposure. The patient has made a well-informed decision to proceed with scheduling testing and has also been given additional printed safety information for their own viewing purposes.      I obtained a brief history from the patient, reviewed the medical problems and current medications, and made medical decisions regarding treatment based on that information.   I spent 30 minutes caring for Preston on this date of service. This time includes time spent by me in the following activities: preparing for the visit, obtaining and/or reviewing a separately obtained history, performing a medically appropriate examination and/or evaluation, counseling and educating the  patient/family/caregiver, ordering medications, tests, or procedures and documenting information in the medical record. I answered all of his questions and he verbalized understanding. Discussion involved sleep apnea, possible health consequences of sleep apnea, split night sleep study and follow-up.           RTC 2 weeks after study for results.             This document has been electronically signed by KAILEY Ferguson on April 14, 2023 09:10 CDT          This document has been electronically signed by KAILEY Ferguson on April 14, 2023         CC: Harry Melo MD Gupta, Puneet Kumar, MD

## 2023-07-28 ENCOUNTER — OFFICE VISIT (OUTPATIENT)
Dept: CARDIOLOGY | Facility: CLINIC | Age: 58
End: 2023-07-28
Payer: OTHER GOVERNMENT

## 2023-07-28 VITALS
HEIGHT: 74 IN | HEART RATE: 111 BPM | OXYGEN SATURATION: 98 % | BODY MASS INDEX: 33.15 KG/M2 | SYSTOLIC BLOOD PRESSURE: 150 MMHG | WEIGHT: 258.3 LBS | DIASTOLIC BLOOD PRESSURE: 88 MMHG

## 2023-07-28 DIAGNOSIS — I51.7 RIGHT VENTRICULAR DILATION: ICD-10-CM

## 2023-07-28 DIAGNOSIS — I10 HYPERTENSION, UNSPECIFIED TYPE: ICD-10-CM

## 2023-07-28 DIAGNOSIS — I77.819 AORTIC DILATATION: ICD-10-CM

## 2023-07-28 DIAGNOSIS — E66.2 CLASS 1 OBESITY WITH ALVEOLAR HYPOVENTILATION, SERIOUS COMORBIDITY, AND BODY MASS INDEX (BMI) OF 33.0 TO 33.9 IN ADULT: ICD-10-CM

## 2023-07-28 DIAGNOSIS — I25.118 CORONARY ARTERY DISEASE INVOLVING NATIVE CORONARY ARTERY OF NATIVE HEART WITH OTHER FORM OF ANGINA PECTORIS: Primary | ICD-10-CM

## 2023-07-28 LAB
QT INTERVAL: 326 MS
QTC INTERVAL: 443 MS

## 2023-07-28 RX ORDER — CARVEDILOL 6.25 MG/1
6.25 TABLET ORAL 2 TIMES DAILY
Qty: 180 TABLET | Refills: 1 | Status: SHIPPED | OUTPATIENT
Start: 2023-07-28

## 2023-07-28 NOTE — PROGRESS NOTES
[facility]  OFFICE NOTE    Cardiovascular Medicine  Alverto Flanagan M.D., [facility]         No referring provider defined for this encounter.      Preston Villalta is a 57 y.o. male who presents for consultation today.   Available prior records from [facility] were reviewed.  According to last cardiology clinic note (September 2016, Dr. Kenrick Abraham); he has a known history of coronary artery disease, s/p MI with stenting to the LAD in 2011.  Last coronary angiography was in July 2013; this showed nonobstructive CAD; stent in the LAD was widely patent; he had an ectatic left main and RCA without stenosis.  Last transthoracic echocardiogram in June 2013 showed LVEF of 55 to 60%, normal biatrial sizes, normal RV cavity size/systolic function, trace MR, trace TR and no evidence of pericardial effusion (by report).  He denies any prior history of open heart surgeries, pacemaker placement or defibrillator placement.  He has not had any cardiac catheterizations after the last cardiac cath in 2013 (according to him).  During his routine day-to-day activities, he denies having any chest discomfort or dyspnea.  Does report feeling mildly short of breath when he overexerts himself.  He has not had any PND, orthopnea or leg swelling.  He denies having any syncope or presyncope.    12/14/2022:  He presented today for a follow-up visit.  He continues to do well from cardiovascular standpoint.  Denied any concerning cardiovascular symptoms today.  Results of recent transthoracic echocardiogram were reviewed with him in detail.  He admits to nighttime snoring, daytime fatigue and daytime sleepiness.  He reports that he was diagnosed with sleep apnea a long time ago; he tried wearing the mask and it did not work out at that time.    7/28/2023:  He presented today for a follow-up visit.  He has been taking all medications regularly as prescribed.  He did not increase the dose of lisinopril as  recommended previously.  He has not had any significant chest discomfort.  He does report having significant exertional dyspnea lately.  He has not had any PND, orthopnea or leg swelling.  He has not gotten his sleep study done yet.  He is willing to get it rescheduled.    Review of Systems - ROS  Constitution: Negative for weight gain.   HENT: Negative for congestion.    Eyes: Negative for blurred vision.   Cardiovascular: As mentioned above  Respiratory: Negative for cough and hemoptysis.    Endocrine: Negative for polydipsia and polyuria.   Hematologic/Lymphatic: Negative for bleeding problem.   Skin: Negative for flushing.   Musculoskeletal: Negative for neck pain and stiffness.   Gastrointestinal: Negative for abdominal pain, nausea and vomiting.   Genitourinary: Negative for dysuria and hematuria.   Neurological: Negative for dizziness, focal weakness and numbness.   Psychiatric/Behavioral: Negative for altered mental status and depression.      All other systems were reviewed and were negative.    Past Medical History:   Diagnosis Date    Cancer     Coronary artery disease     Hypertension     Myocardial infarction     Sleep apnea        Family History:  family history includes Diabetes in his mother; Heart attack in his father; Stroke in his father and mother.    Social History: Denies current alcohol or illicit drug abuse.   reports that he quit smoking about 13 years ago. His smoking use included cigarettes. He smoked an average of 1 pack per day. He has never used smokeless tobacco. He reports that he does not currently use alcohol. He reports that he does not use drugs.    Allergies:  Allergies   Allergen Reactions    Penicillins Itching and Rash     Itch  Itch           Current Outpatient Medications:     aspirin 81 MG chewable tablet, Chew 1 tablet Daily., Disp: , Rfl:     cholecalciferol (VITAMIN D3) 25 MCG (1000 UT) tablet, Take 1 tablet by mouth Daily., Disp: , Rfl:     clopidogrel (PLAVIX) 75 MG  "tablet, Take 1 tablet by mouth Daily., Disp: , Rfl:     cyclobenzaprine (FLEXERIL) 10 MG tablet, Take 1 tablet by mouth 3 (Three) Times a Day As Needed for Muscle Spasms., Disp: , Rfl:     dilTIAZem CD (CARDIZEM CD) 240 MG 24 hr capsule, Take 1 capsule by mouth Daily., Disp: , Rfl:     diphenhydrAMINE (BENADRYL) 25 mg capsule, Take 1 capsule by mouth Every 6 (Six) Hours As Needed for Itching., Disp: , Rfl:     docusate sodium (COLACE) 100 MG capsule, Take 1 capsule by mouth 2 (Two) Times a Day., Disp: , Rfl:     HYDROcodone-acetaminophen (NORCO)  MG per tablet, Take 1 tablet by mouth Every 6 (Six) Hours As Needed for Moderate Pain., Disp: , Rfl:     lisinopril (PRINIVIL,ZESTRIL) 20 MG tablet, Take 1 tablet by mouth Daily., Disp: , Rfl:     meclizine 25 MG chewable tablet chewable tablet, Chew 1 tablet 3 (Three) Times a Day As Needed., Disp: , Rfl:     melatonin 5 MG tablet tablet, Take 1 tablet by mouth., Disp: , Rfl:     milnacipran (SAVELLA) 50 MG tablet tablet, Take  by mouth 2 (Two) Times a Day., Disp: , Rfl:     Omega-3 Fatty Acids (fish oil) 1000 MG capsule capsule, Take  by mouth Daily With Breakfast., Disp: , Rfl:     pregabalin (LYRICA) 100 MG capsule, Take 1 capsule by mouth 2 (Two) Times a Day., Disp: , Rfl:     Rosuvastatin Calcium 40 MG capsule sprinkle, Take  by mouth., Disp: , Rfl:     vitamin E 100 UNIT capsule, Take 1 capsule by mouth Daily., Disp: , Rfl:     carvedilol (COREG) 6.25 MG tablet, Take 1 tablet by mouth 2 (Two) Times a Day., Disp: 180 tablet, Rfl: 1    Physical Exam:  Vitals:    07/28/23 1127   BP: 150/88   BP Location: Left arm   Patient Position: Sitting   Cuff Size: Adult   Pulse: 111   SpO2: 98%   Weight: 117 kg (258 lb 4.8 oz)   Height: 188 cm (74.02\")   PainSc: 0-No pain     Current Pain Level: none  Pulse Ox: Normal  on room air  General: alert, appears stated age and cooperative     Body Habitus: Obese  HEENT: Head: Normocephalic, no lesions, without obvious abnormality.   "   Neuro: alert, oriented x3  JVP: Volume/Pulsation: Normal.  Normal waveforms.   Carotid Exam: no bruit normal pulsation bilaterally     Respirations: no increased work of breathing   Chest:  Normal    Pulmonary:No crackles or wheezes   Heart rate: normal    Heart Rhythm: regular     Heart Sounds: S1: normal  S2: normal  S3: absent     Faint systolic murmur heard at right upper sternal border  Abdomen:   Appearance: normal .  Palpation: Soft, non-tender to palpation, bowel sounds positive in all four quadrants  Extremity: no significant pitting lower extremity edema.     DATA REVIEWED:     EKG. I personally reviewed and interpreted the EKG.  normal sinus rhythm, 1st degree AV block, poor R wave progression, nonspecific ST and T wave abnormality on 9/30/2022    ECG/EMG Results (all)       Procedure Component Value Units Date/Time    ECG 12 Lead [[id number]] Collected: 09/30/22 0851     Updated: 09/30/22 0902     QT Interval 358 ms      QTC Interval 442 ms     Narrative:      Test Reason : est care  Blood Pressure :   */*   mmHG  Vent. Rate :  92 BPM     Atrial Rate :  92 BPM     P-R Int : 244 ms          QRS Dur :  94 ms      QT Int : 358 ms       P-R-T Axes :  57  45  68 degrees     QTc Int : 442 ms    Sinus rhythm with 1st degree AV block  Possible Anterior infarct , age undetermined  Abnormal ECG  No previous ECGs available    Referred By: TERRANCE           Confirmed By:           ---------------------------------------------------  TTE/EDWARD:  Results for orders placed in visit on 12/08/22    Adult Transthoracic Echo Complete W/ Cont if Necessary Per Protocol    Interpretation Summary    Left ventricular systolic function is normal. Left ventricular ejection fraction appears to be 56 - 60%.    Left ventricular diastolic function was normal.    The right ventricular cavity is borderline dilated.    Saline test results did not show any definite evidence of right to left atrial level shunt at baseline state. Valsalva  maneuver was not performed.    Aortic root appears dilated and measured at 4.4 cm. Proximal ascending aorta measured at 3.9 cm.      -----------------------------------------------------  CXR/Imaging:   Imaging Results (Most Recent)       None            -----------------------------------------------------  CT:   No radiology results for the last 30 days.    ----------------------------------------------------      --------------------------------------------------------------------------------------------------  LABS:     The CVD Risk score (Jose et al., 2008) failed to calculate for the following reasons:    Cannot find a previous HDL lab    Cannot find a previous total cholesterol lab         Lab Results   Component Value Date    GLUCOSE 169 (H) 09/30/2022    BUN 9 09/30/2022    CREATININE 0.76 09/30/2022    BCR 11.8 09/30/2022    K 3.9 09/30/2022    CO2 27.5 09/30/2022    CALCIUM 9.5 09/30/2022     Lab Results   Component Value Date    WBC 4.83 09/30/2022    HGB 14.4 09/30/2022    HCT 41.5 09/30/2022    MCV 88.1 09/30/2022     09/30/2022     Lab Results   Component Value Date    CHOL 126 09/30/2022    TRIG 154 (H) 09/30/2022    HDL 41 09/30/2022    LDL 59 09/30/2022     Lab Results   Component Value Date    TSH 0.704 09/30/2022     No results found for: CKTOTAL, CKMB, CKMBINDEX, TROPONINI, TROPONINT  No results found for: HGBA1C  No results found for: DDIMER  No results found for: ALT  No results found for: HGBA1C  Lab Results   Component Value Date    CREATININE 0.76 09/30/2022     No results found for: IRON, TIBC, FERRITIN  No results found for: INR, PROTIME    [unfilled]    1. Coronary artery disease involving native coronary artery of native heart with possible angina pectoris  According to prior cardiology clinic note from [facility] (September 2016, Dr. Kenrick Abraham); he has a known history of coronary artery disease, s/p MI with stenting to the LAD in 2011.  Last coronary  angiography was in July 2013; this showed nonobstructive CAD; stent in the LAD was widely patent; he had an ectatic left main and RCA without stenosis.  Transthoracic echocardiogram in June 2013 showed LVEF of 55 to 60%, normal biatrial sizes, normal RV cavity size/systolic function, trace MR, trace TR and no evidence of pericardial effusion (by report).  Transthoracic echocardiogram in December 2022 showed LVEF of 56 to 60%, mild concentric LVH, normal biatrial sizes, trace MR, trace TR, mild AL and a trivial pericardial effusion.  Right ventricular cavity was borderline dilated.  Proximal ascending aorta measured at 3.9 cm.  He has noted significant exertional dyspnea with mild-moderate exertion lately.  Continue baby aspirin, Plavix, Crestor, fish oil and diltiazem therapy.  Initiate carvedilol therapy to further optimize hypertension control.  We will obtain an exercise nuclear stress test for further evaluation of his dyspnea.  Serum LDL cholesterol was at goal in September 2022.  Serum triglyceride level was borderline elevated at 154 mg/dL.  Conservative measures were recommended.    2. Right ventricular dilation  At previous visit, he reported nighttime snoring, daytime fatigue and daytime sleepiness.  He was diagnosed with sleep apnea a long time ago; he tried wearing the mask and it did not work out at that time.  Given the finding of RV dilation on recent transthoracic echocardiogram, he was referred to sleep medicine for further evaluation at previous visits.  He has not gotten his sleep study done yet; he was encouraged to get it rescheduled.    3. Hypertension, unspecified type  4. Aortic dilation  Clinic BP was 150/88 mm Hg today.  Continue diltiazem at current doses.  Continue lisinopril 20 mg oral daily.  Dietary sodium restriction was discussed.  Initiate carvedilol 6.25 mg orally twice daily.  On his most recent TTE, proximal ascending aorta appeared dilated and measured at 3.9 cm.  We will  continue to monitor for now.    5. Class 1 obesity  (HCC)  Dietary modification and regular physical activity were discussed.      Prevention:  BMI is >= 30 and <35. (Class 1 Obesity). The following options were offered after discussion;: referral to primary care      Preston Villalta  reports that he quit smoking about 13 years ago. His smoking use included cigarettes. He smoked an average of 1 pack per day. He has never used smokeless tobacco.. I have educated him on continued tobacco cessation.      Return in about 3 months (around 10/28/2023).            Electronically signed by Alverto Flanagan MD on 07/28/23 at 09:12 CDT

## 2023-08-04 ENCOUNTER — HOSPITAL ENCOUNTER (OUTPATIENT)
Dept: NUCLEAR MEDICINE | Facility: HOSPITAL | Age: 58
Discharge: HOME | End: 2023-08-04
Payer: OTHER GOVERNMENT

## 2023-08-04 ENCOUNTER — HOSPITAL ENCOUNTER (OUTPATIENT)
Dept: CARDIOLOGY | Facility: HOSPITAL | Age: 58
Discharge: HOME | End: 2023-08-04
Payer: OTHER GOVERNMENT

## 2023-08-04 DIAGNOSIS — I25.118 CORONARY ARTERY DISEASE INVOLVING NATIVE CORONARY ARTERY OF NATIVE HEART WITH OTHER FORM OF ANGINA PECTORIS: ICD-10-CM

## 2023-08-04 LAB
BH CV REST NUCLEAR ISOTOPE DOSE: 10.6 MCI
BH CV STRESS BP STAGE 1: NORMAL
BH CV STRESS BP STAGE 2: NORMAL
BH CV STRESS BP STAGE 3: NORMAL
BH CV STRESS DURATION MIN STAGE 1: 3
BH CV STRESS DURATION MIN STAGE 2: 3
BH CV STRESS DURATION MIN STAGE 3: 1
BH CV STRESS DURATION SEC STAGE 1: 0
BH CV STRESS DURATION SEC STAGE 2: 0
BH CV STRESS DURATION SEC STAGE 3: 22
BH CV STRESS GRADE STAGE 1: 10
BH CV STRESS GRADE STAGE 2: 12
BH CV STRESS GRADE STAGE 3: 14
BH CV STRESS HR STAGE 1: 111
BH CV STRESS HR STAGE 2: 127
BH CV STRESS HR STAGE 3: 139
BH CV STRESS METS STAGE 1: 5
BH CV STRESS METS STAGE 2: 7.5
BH CV STRESS METS STAGE 3: 10
BH CV STRESS NUCLEAR ISOTOPE DOSE: 38.2 MCI
BH CV STRESS PROTOCOL 1: NORMAL
BH CV STRESS RECOVERY BP: NORMAL MMHG
BH CV STRESS RECOVERY HR: 112 BPM
BH CV STRESS SPEED STAGE 1: 1.7
BH CV STRESS SPEED STAGE 2: 2.5
BH CV STRESS SPEED STAGE 3: 3.4
BH CV STRESS STAGE 1: 1
BH CV STRESS STAGE 2: 2
BH CV STRESS STAGE 3: 3
LV EF NUC BP: 64 %
MAXIMAL PREDICTED HEART RATE: 163 BPM
PERCENT MAX PREDICTED HR: 87.12 %
STRESS BASELINE BP: NORMAL MMHG
STRESS BASELINE HR: 96 BPM
STRESS PERCENT HR: 102 %
STRESS POST ESTIMATED WORKLOAD: 6.9 METS
STRESS POST EXERCISE DUR MIN: 7 MIN
STRESS POST EXERCISE DUR SEC: 21 SEC
STRESS POST PEAK BP: NORMAL MMHG
STRESS POST PEAK HR: 142 BPM
STRESS TARGET HR: 139 BPM

## 2023-08-04 PROCEDURE — 78452 HT MUSCLE IMAGE SPECT MULT: CPT

## 2023-08-04 PROCEDURE — A9500 TC99M SESTAMIBI: HCPCS | Performed by: INTERNAL MEDICINE

## 2023-08-04 PROCEDURE — 0 TECHNETIUM SESTAMIBI: Performed by: INTERNAL MEDICINE

## 2023-08-04 PROCEDURE — 93017 CV STRESS TEST TRACING ONLY: CPT

## 2023-08-04 RX ADMIN — TECHNETIUM TC 99M SESTAMIBI 1 DOSE: 1 INJECTION INTRAVENOUS at 09:12

## 2023-08-04 RX ADMIN — TECHNETIUM TC 99M SESTAMIBI 1 DOSE: 1 INJECTION INTRAVENOUS at 07:54

## 2023-08-06 LAB
QT INTERVAL: 326 MS
QTC INTERVAL: 443 MS

## 2023-08-08 ENCOUNTER — TELEPHONE (OUTPATIENT)
Dept: CARDIOLOGY | Facility: CLINIC | Age: 58
End: 2023-08-08
Payer: OTHER GOVERNMENT

## 2023-08-08 NOTE — TELEPHONE ENCOUNTER
Attempted to call pt not available at this time. Left VM.      ----- Message from Alverto Flanagan MD sent at 8/7/2023  4:45 PM CDT -----  Exercise nuclear stress test suggested low risk findings.

## 2023-08-09 ENCOUNTER — TELEPHONE (OUTPATIENT)
Dept: CARDIOLOGY | Facility: CLINIC | Age: 58
End: 2023-08-09
Payer: OTHER GOVERNMENT

## 2023-08-09 NOTE — TELEPHONE ENCOUNTER
Attempted to call pt not not available at this time.      ----- Message from Alverto Flanagan MD sent at 8/7/2023  4:45 PM CDT -----  Exercise nuclear stress test suggested low risk findings.

## 2023-08-11 ENCOUNTER — TELEPHONE (OUTPATIENT)
Dept: CARDIOLOGY | Facility: CLINIC | Age: 58
End: 2023-08-11
Payer: OTHER GOVERNMENT

## 2023-08-11 NOTE — TELEPHONE ENCOUNTER
Attempted to call pt for the 3 time. No Answer.     ----- Message from Alverto Flanagan MD sent at 8/7/2023  4:45 PM CDT -----  Exercise nuclear stress test suggested low risk findings.